# Patient Record
Sex: FEMALE | Race: WHITE | Employment: FULL TIME | URBAN - METROPOLITAN AREA
[De-identification: names, ages, dates, MRNs, and addresses within clinical notes are randomized per-mention and may not be internally consistent; named-entity substitution may affect disease eponyms.]

---

## 2019-08-01 PROBLEM — M47.812 SPONDYLOSIS OF CERVICAL REGION WITHOUT MYELOPATHY OR RADICULOPATHY: Status: ACTIVE | Noted: 2019-01-21

## 2021-07-07 ENCOUNTER — HOSPITAL ENCOUNTER (OUTPATIENT)
Dept: MAMMOGRAPHY | Age: 36
Discharge: HOME | End: 2021-07-07
Attending: OBSTETRICS & GYNECOLOGY
Payer: COMMERCIAL

## 2021-07-07 DIAGNOSIS — N63.15 BREAST LUMP ON RIGHT SIDE AT 12 O'CLOCK POSITION: ICD-10-CM

## 2021-07-07 PROCEDURE — 76642 ULTRASOUND BREAST LIMITED: CPT

## 2021-07-07 PROCEDURE — 77066 DX MAMMO INCL CAD BI: CPT

## 2022-03-18 PROBLEM — M47.812 SPONDYLOSIS OF CERVICAL REGION WITHOUT MYELOPATHY OR RADICULOPATHY: Status: ACTIVE | Noted: 2019-01-21

## 2022-06-30 ENCOUNTER — OFFICE VISIT (OUTPATIENT)
Dept: OBGYN CLINIC | Age: 37
End: 2022-06-30
Payer: COMMERCIAL

## 2022-06-30 VITALS
SYSTOLIC BLOOD PRESSURE: 104 MMHG | DIASTOLIC BLOOD PRESSURE: 70 MMHG | HEIGHT: 62 IN | BODY MASS INDEX: 35.07 KG/M2 | WEIGHT: 190.6 LBS

## 2022-06-30 DIAGNOSIS — Z01.419 WELL WOMAN EXAM: Primary | ICD-10-CM

## 2022-06-30 DIAGNOSIS — Z13.89 SCREENING FOR GENITOURINARY CONDITION: ICD-10-CM

## 2022-06-30 DIAGNOSIS — R87.610 ASCUS OF CERVIX WITH NEGATIVE HIGH RISK HPV: ICD-10-CM

## 2022-06-30 PROCEDURE — 99395 PREV VISIT EST AGE 18-39: CPT | Performed by: OBSTETRICS & GYNECOLOGY

## 2022-06-30 ASSESSMENT — PATIENT HEALTH QUESTIONNAIRE - PHQ9
SUM OF ALL RESPONSES TO PHQ9 QUESTIONS 1 & 2: 0
2. FEELING DOWN, DEPRESSED OR HOPELESS: 0
SUM OF ALL RESPONSES TO PHQ QUESTIONS 1-9: 0
1. LITTLE INTEREST OR PLEASURE IN DOING THINGS: 0
SUM OF ALL RESPONSES TO PHQ QUESTIONS 1-9: 0

## 2022-06-30 NOTE — PROGRESS NOTES
HPI: Ms. Radha Lane is a 40 y.o. female who is here today for a well woman exam. She complains of wanting to discuss pap from last year, ascus, HPV neg. Allergies   Allergen Reactions    Codeine Itching       Current Outpatient Medications   Medication Sig Dispense Refill    ALPRAZolam (XANAX) 0.5 MG tablet TAKE 1 TABLET BY MOUTH TWICE A DAY      escitalopram (LEXAPRO) 20 MG tablet TAKE 1 TABLET BY MOUTH EVERY DAY       No current facility-administered medications for this visit. Past Medical History:   Diagnosis Date    Abnormal Papanicolaou smear of cervix     Depression     Endometriosis     GERD (gastroesophageal reflux disease)     Ovarian cyst     UTI (urinary tract infection)      Past Surgical History:   Procedure Laterality Date    LAPAROSCOPY      SALPINGO-OOPHORECTOMY       Social History     Socioeconomic History    Marital status:      Spouse name: Not on file    Number of children: Not on file    Years of education: Not on file    Highest education level: Not on file   Occupational History    Not on file   Tobacco Use    Smoking status: Never Smoker    Smokeless tobacco: Never Used   Vaping Use    Vaping Use: Never used   Substance and Sexual Activity    Alcohol use: Yes     Comment: socially     Drug use: No    Sexual activity: Yes     Partners: Male     Birth control/protection: None   Other Topics Concern    Not on file   Social History Narrative    Not on file     Social Determinants of Health     Financial Resource Strain:     Difficulty of Paying Living Expenses: Not on file   Food Insecurity:     Worried About Running Out of Food in the Last Year: Not on file    Jazmine of Food in the Last Year: Not on file   Transportation Needs:     Lack of Transportation (Medical): Not on file    Lack of Transportation (Non-Medical):  Not on file   Physical Activity:     Days of Exercise per Week: Not on file    Minutes of Exercise per Session: Not on file   Stress:  Feeling of Stress : Not on file   Social Connections:     Frequency of Communication with Friends and Family: Not on file    Frequency of Social Gatherings with Friends and Family: Not on file    Attends Druze Services: Not on file    Active Member of Clubs or Organizations: Not on file    Attends Club or Organization Meetings: Not on file    Marital Status: Not on file   Intimate Partner Violence:     Fear of Current or Ex-Partner: Not on file    Emotionally Abused: Not on file    Physically Abused: Not on file    Sexually Abused: Not on file   Housing Stability:     Unable to Pay for Housing in the Last Year: Not on file    Number of [place] in the Last Year: Not on file    Unstable Housing in the Last Year: Not on file     Family History   Problem Relation Age of Onset    Hypertension Father     Cancer Maternal Grandmother         Brain    Elevated Lipids Maternal Grandmother         Date Performed Result   PAP 6/25/21 Ascus, hpv neg   Mammogram 7/7/21 with ultrasound of right breast birads 2, BD3   Colonoscopy never n/a   Dexa never n/a     Review of Systems   All other systems reviewed and are negative. /70 (Site: Right Upper Arm, Position: Sitting)   Ht 5' 2\" (1.575 m)   Wt 190 lb 9.6 oz (86.5 kg)   LMP 06/15/2022 (Within Days)   Breastfeeding No   BMI 34.86 kg/m²      Physical Exam  Constitutional:       Appearance: Normal appearance. HENT:      Head: Normocephalic. Cardiovascular:      Rate and Rhythm: Normal rate and regular rhythm. Heart sounds: Normal heart sounds. Pulmonary:      Effort: Pulmonary effort is normal.      Breath sounds: Normal breath sounds. Comments: Bilateral breast exam shows no skin changes, no retraction, no nipple changes or discharge, no masses, no supraclavicular masses and no axillary masses or nodules    Abdominal:      General: Abdomen is flat. Palpations: Abdomen is soft. There is no mass.    Genitourinary: General: Normal vulva. Comments: Normal bimanual  Musculoskeletal:         General: Normal range of motion. Skin:     General: Skin is warm and dry. Neurological:      General: No focal deficit present. Mental Status: She is alert. Psychiatric:         Mood and Affect: Mood normal.           Tests:  No results found for any visits on 06/30/22. Assessment/Plan  Diagnoses and all orders for this visit:    Well woman exam    Screening for genitourinary condition  -     AMB POC URINALYSIS DIP STICK MANUAL W/O MICRO         No follow-up provider specified.

## 2022-07-07 LAB
CYTOLOGIST CVX/VAG CYTO: NORMAL
CYTOLOGY CVX/VAG DOC THIN PREP: NORMAL
HPV APTIMA: NEGATIVE
Lab: NORMAL
PATH REPORT.FINAL DX SPEC: NORMAL
STAT OF ADQ CVX/VAG CYTO-IMP: NORMAL

## 2022-07-11 ENCOUNTER — PROCEDURE VISIT (OUTPATIENT)
Dept: OBGYN CLINIC | Age: 37
End: 2022-07-11
Payer: COMMERCIAL

## 2022-07-11 ENCOUNTER — TELEPHONE (OUTPATIENT)
Dept: OBGYN CLINIC | Age: 37
End: 2022-07-11

## 2022-07-11 DIAGNOSIS — R10.2 PELVIC PAIN IN FEMALE: Primary | ICD-10-CM

## 2022-07-11 PROCEDURE — 76830 TRANSVAGINAL US NON-OB: CPT | Performed by: OBSTETRICS & GYNECOLOGY

## 2022-07-11 NOTE — Clinical Note

## 2022-07-11 NOTE — TELEPHONE ENCOUNTER
Patient was last seen on 6/30/22 for WWE, according to her chart she has hx of laparoscopy and left salpingo-oophorectomy. Pt states that she is having pain in her RLQ. Pt reports hx of ovarian cysts and believes that is what her pain is related to now. Pain began 2 days ago. She has tried tylenol and ibuprofen without relief. LMP 6/15/22 approx.

## 2022-07-11 NOTE — TELEPHONE ENCOUNTER
Calls and said she is having a lot of pain, she has had ovarian cysts in the past and is pretty sure that is what is going on.

## 2022-07-12 ENCOUNTER — OFFICE VISIT (OUTPATIENT)
Dept: OBGYN CLINIC | Age: 37
End: 2022-07-12
Payer: COMMERCIAL

## 2022-07-12 VITALS
BODY MASS INDEX: 34.93 KG/M2 | DIASTOLIC BLOOD PRESSURE: 74 MMHG | WEIGHT: 189.8 LBS | SYSTOLIC BLOOD PRESSURE: 122 MMHG | HEIGHT: 62 IN

## 2022-07-12 DIAGNOSIS — R10.2 PELVIC PAIN IN FEMALE: Primary | ICD-10-CM

## 2022-07-12 PROCEDURE — 99214 OFFICE O/P EST MOD 30 MIN: CPT | Performed by: OBSTETRICS & GYNECOLOGY

## 2022-07-12 RX ORDER — NAPROXEN SODIUM 550 MG/1
550 TABLET ORAL 2 TIMES DAILY WITH MEALS
Qty: 60 TABLET | Refills: 0 | Status: SHIPPED | OUTPATIENT
Start: 2022-07-12

## 2022-07-12 ASSESSMENT — PATIENT HEALTH QUESTIONNAIRE - PHQ9
2. FEELING DOWN, DEPRESSED OR HOPELESS: 0
SUM OF ALL RESPONSES TO PHQ9 QUESTIONS 1 & 2: 0
SUM OF ALL RESPONSES TO PHQ QUESTIONS 1-9: 0
1. LITTLE INTEREST OR PLEASURE IN DOING THINGS: 0

## 2022-07-12 NOTE — PROGRESS NOTES
Pham Fox  is a 40 y.o. female, No obstetric history on file., Patient's last menstrual period was 06/15/2022 (within days). ,  who is seen to follow up on ultrasound that she had yesterday. She continues to have RLQ pain, states that the pain is worse with movement. See report in chart. Started Saturday gradual to at night severe pain now cramping and more with movement or cough or sneeze    \"I get these cysts all the time with short pain\"      HISTORY:  Sexual History:  has sex with males    Current Outpatient Medications on File Prior to Visit   Medication Sig Dispense Refill    ALPRAZolam (XANAX) 0.5 MG tablet 0.5 mg as needed. TAKE 1 TABLET BY MOUTH TWICE A DAY/ PRN      escitalopram (LEXAPRO) 20 MG tablet TAKE 1 TABLET BY MOUTH EVERY DAY       No current facility-administered medications on file prior to visit. ROS:  Review of Systems   All other systems reviewed and are negative. Pham Fox  has a past medical history of Abnormal Papanicolaou smear of cervix, Depression, Endometriosis, GERD (gastroesophageal reflux disease), Ovarian cyst, and UTI (urinary tract infection). .    Previous surgeries include  has a past surgical history that includes laparoscopy and Salpingo-oophorectomy. .    Her current meds are   Current Outpatient Medications:     naproxen sodium (ANAPROX DS) 550 MG tablet, Take 1 tablet by mouth 2 times daily (with meals), Disp: 60 tablet, Rfl: 0    ALPRAZolam (XANAX) 0.5 MG tablet, 0.5 mg as needed. TAKE 1 TABLET BY MOUTH TWICE A DAY/ PRN, Disp: , Rfl:     escitalopram (LEXAPRO) 20 MG tablet, TAKE 1 TABLET BY MOUTH EVERY DAY, Disp: , Rfl:      Family history is significant for family history includes Cancer in her maternal grandmother; Elevated Lipids in her maternal grandmother; Hypertension in her father. .       PHYSICAL EXAM:  Blood pressure 122/74, height 5' 2\" (1.575 m), weight 189 lb 12.8 oz (86.1 kg), last menstrual period 06/15/2022, not currently breastfeeding.  OBGyn Exam

## 2022-07-19 RX ORDER — ELAGOLIX 200 MG/1
1 TABLET, FILM COATED ORAL 2 TIMES DAILY
Qty: 60 TABLET | Refills: 2 | Status: SHIPPED | OUTPATIENT
Start: 2022-07-19

## 2022-07-19 NOTE — TELEPHONE ENCOUNTER
Received fax from [facility] stating that her insurance plan has met requirements for rx of Tina Hernandez and now needs rx sent to pharmacy. Office note from 7/12/22 states rx should be for orilissa 200 mg BID for 3 months then return for visit.

## 2024-02-14 ENCOUNTER — OFFICE VISIT (OUTPATIENT)
Dept: OBGYN CLINIC | Age: 39
End: 2024-02-14
Payer: COMMERCIAL

## 2024-02-14 VITALS
BODY MASS INDEX: 34.34 KG/M2 | HEIGHT: 62 IN | SYSTOLIC BLOOD PRESSURE: 112 MMHG | WEIGHT: 186.6 LBS | DIASTOLIC BLOOD PRESSURE: 80 MMHG

## 2024-02-14 DIAGNOSIS — R10.2 PELVIC PAIN IN FEMALE: Primary | ICD-10-CM

## 2024-02-14 PROCEDURE — 99214 OFFICE O/P EST MOD 30 MIN: CPT | Performed by: OBSTETRICS & GYNECOLOGY

## 2024-02-14 RX ORDER — METHENAMINE, SODIUM PHOSPHATE, MONOBASIC, MONOHYDRATE, PHENYL SALICYLATE, METHYLENE BLUE, AND HYOSCYAMINE SULFATE 118; 40.8; 36; 10; .12 MG/1; MG/1; MG/1; MG/1; MG/1
1 CAPSULE ORAL 4 TIMES DAILY
Qty: 40 CAPSULE | Refills: 0 | Status: SHIPPED | OUTPATIENT
Start: 2024-02-14

## 2024-02-14 NOTE — PROGRESS NOTES
Oneyda  is a 38 y.o. female, No obstetric history on file., Patient's last menstrual period was 02/03/2024 (exact date).,  who is seen for periods that are becoming heavier, lower back pain, and pelvic pain in center of pelvis. She is having pain during and the day after intercourse. Periods were previously 4 days but now are lasting 7 days. She states that her back pain has been occurring x3-4 months, heavier periods within the past 8 months, pelvic pain began 8-12 months ago. She also states that there is a feeling of heaviness in her pelvis, similar to something pressing on her bladder but does not have much urine when she uses the restroom.     3 of 7 days have to change products 1-2 hours in day  Mid lower abdomen with periods and day after intercourse more pain and fullness    Orilissa caused hot flushes and felt \"ill\"    HISTORY:  Sexual History:  single partner, contraception - none  Contraception:  none  Current Outpatient Medications on File Prior to Visit   Medication Sig Dispense Refill    tretinoin (RETIN-A) 0.025 % cream Apply topically nightly      ALPRAZolam (XANAX) 0.5 MG tablet 1 tablet as needed. TAKE 1 TABLET BY MOUTH TWICE A DAY/ PRN      escitalopram (LEXAPRO) 20 MG tablet TAKE 1 TABLET BY MOUTH EVERY DAY       No current facility-administered medications on file prior to visit.       ROS:  Review of Systems     Oneyda  has a past medical history of Abnormal Papanicolaou smear of cervix, Complication of anesthesia, Depression, Endometriosis, GERD (gastroesophageal reflux disease), Migraine, Ovarian cyst, and UTI (urinary tract infection). .    Previous surgeries include  has a past surgical history that includes laparoscopy and Salpingo-oophorectomy (Left)..    Her current meds are   Current Outpatient Medications:     tretinoin (RETIN-A) 0.025 % cream, Apply topically nightly, Disp: , Rfl:     Meth-Hyo-M Bl-Na Phos-Ph Sal (URIBEL) 118 MG CAPS, Take 1 capsule by mouth 4 times daily, Disp: 40

## 2024-03-18 ENCOUNTER — OFFICE VISIT (OUTPATIENT)
Dept: OBGYN CLINIC | Age: 39
End: 2024-03-18
Payer: COMMERCIAL

## 2024-03-18 ENCOUNTER — PROCEDURE VISIT (OUTPATIENT)
Dept: OBGYN CLINIC | Age: 39
End: 2024-03-18
Payer: COMMERCIAL

## 2024-03-18 VITALS — DIASTOLIC BLOOD PRESSURE: 72 MMHG | SYSTOLIC BLOOD PRESSURE: 110 MMHG

## 2024-03-18 DIAGNOSIS — R10.2 PELVIC PAIN IN FEMALE: Primary | ICD-10-CM

## 2024-03-18 DIAGNOSIS — Z87.42 PERSONAL HISTORY OF OVARIAN CYST: ICD-10-CM

## 2024-03-18 DIAGNOSIS — N83.201 CYST OF RIGHT OVARY: ICD-10-CM

## 2024-03-18 DIAGNOSIS — Z87.42 PERSONAL HISTORY OF ENDOMETRIOSIS: ICD-10-CM

## 2024-03-18 PROCEDURE — 76830 TRANSVAGINAL US NON-OB: CPT | Performed by: OBSTETRICS & GYNECOLOGY

## 2024-03-18 PROCEDURE — 99214 OFFICE O/P EST MOD 30 MIN: CPT | Performed by: OBSTETRICS & GYNECOLOGY

## 2024-03-18 ASSESSMENT — PATIENT HEALTH QUESTIONNAIRE - PHQ9
SUM OF ALL RESPONSES TO PHQ QUESTIONS 1-9: 0
SUM OF ALL RESPONSES TO PHQ QUESTIONS 1-9: 0
1. LITTLE INTEREST OR PLEASURE IN DOING THINGS: NOT AT ALL
SUM OF ALL RESPONSES TO PHQ QUESTIONS 1-9: 0
SUM OF ALL RESPONSES TO PHQ QUESTIONS 1-9: 0
2. FEELING DOWN, DEPRESSED OR HOPELESS: NOT AT ALL
SUM OF ALL RESPONSES TO PHQ9 QUESTIONS 1 & 2: 0

## 2024-03-18 NOTE — PROGRESS NOTES
robotic assistance plus or minus removing her right ovary.  No orders of the defined types were placed in this encounter.       Portions of this note were created using voice recognition software. Despite my efforts to edit grammatical and transcription errors, bizarre and nonsensical sentences may still exist.     No

## 2024-03-22 PROBLEM — N94.6 DYSMENORRHEA: Status: ACTIVE | Noted: 2024-04-11

## 2024-03-22 PROBLEM — N83.209 OVARIAN CYST: Status: ACTIVE | Noted: 2024-04-11

## 2024-03-22 PROBLEM — N93.9 ABNORMAL UTERINE BLEEDING: Status: ACTIVE | Noted: 2024-04-11

## 2024-03-26 NOTE — PROGRESS NOTES
Enhanced Recovery After GYN Surgery: non-diabetic patients    It is highly recommended you purchase and drink Ensure Complete - one bottle twice daily for five days starting on 04/05/24. Ensure Complete is the preferred formula over other Ensure formulas. It is recommended that you continue drinking this for one month after surgery.    The night before surgery 04/10/24, drink 2 bottles of the Ensure Pre-Surgery drink.     The morning of surgery 04/11/24, drink one bottle of the Ensure Pre-Surgery drink 2 hours prior to your arrival to the hospital. Drink this over 5-10 minutes.    Drink nothing else after drinking the pre-surgical drink the morning of surgery.    Bring your patient handbook with you to the hospital.    Things to remember:    1. You will be given clear liquids to drink, advancing diet as tolerated    2. You will be up and moving around with assistance 2-4 hours after surgery.    3. You will be given regularly scheduled pain medications (NSAIDS, Tylenol) with narcotics as needed.    4. You may be able to go home that night if the surgeon okays and you are up and eating and drinking. Otherwise, your discharge will be the following morning around lunch time.     5. Continue drinking Ensure Complete for 5 days after surgery.

## 2024-03-29 RX ORDER — UBROGEPANT 100 MG/1
100 TABLET ORAL DAILY PRN
COMMUNITY

## 2024-03-29 NOTE — PERIOP NOTE
PLEASE CONTINUE TAKING ALL PRESCRIPTION MEDICATIONS UP TO THE DAY OF SURGERY UNLESS OTHERWISE DIRECTED BELOW. You may take Tylenol, allergy, and/or indigestion medications.     TAKE ONLY THESE MEDICATIONS ON THE DAY OF SURGERY OB 04/11/24      Xanax if needed, Ubrelvy if needed            DISCONTINUE all vitamins, herbals, and supplements 7 days prior to surgery. DISCONTINUE Non-Steroidal Anti-Inflammatory (NSAIDS) such as Advil, Ibuprofen, Motrin, Aspirin, Naproxen, and Aleve 5 days prior to surgery.     Home Medications to Hold- please continue all other medications except these.      NONE        Comments      On the day before surgery (04/10/24) please take 2 Tylenol in the morning and then again before bed. You may use either regular or extra strength.      Bring: Photo ID and Insurance card        Please do not bring home medications with you on the day of surgery unless otherwise directed by your nurse.  If you are instructed to bring home medications, please give them to your nurse as they will be administered by the nursing staff.    If you have any questions, please call [facility] [phone].    A copy of this note was provided to the patient for reference.

## 2024-03-29 NOTE — PERIOP NOTE
Patient verified name and     Order for consent NOT found in EHR; patient verified.     Type 2 surgery phone assessment complete.     Labs per surgeon: orders NOT received at time of assessment.   Labs per anesthesia protocol: HGB to be collected at gyn class on 4/3/24. T&S to be collected dos.   EKG: not needed per anesthesia protocol.       Patient informed of GYN class on 4/3/24 (9:30 am) at which time labs will be drawn. Patient will also receive all patient education and if staying overnight will receive hospital approved surgical skin cleanser; if not, patient will use non-moisturizing soap.    Patient instructed to hold all vitamins 7 days prior to surgery and NSAIDS 5 days prior to surgery, patient verbalized understanding.    Patient instructed to continue previous medications as prescribed prior to surgery and to take the following medications the day of surgery according to anesthesia guidelines with a small sip of water: Xanax if needed, Ubrelvy if needed.     Patient answered medical/surgical history questions at their best of ability. All prior to admission medications documented in [facility].

## 2024-04-03 ENCOUNTER — HOSPITAL ENCOUNTER (OUTPATIENT)
Dept: SURGERY | Age: 39
Discharge: HOME | End: 2024-04-06
Payer: COMMERCIAL

## 2024-04-03 DIAGNOSIS — Z01.818 PRE-OP TESTING: ICD-10-CM

## 2024-04-03 LAB — HGB BLD-MCNC: 14 G/DL (ref 11.7–15.4)

## 2024-04-03 PROCEDURE — 85018 HEMOGLOBIN: CPT

## 2024-04-03 PROCEDURE — 36415 COLL VENOUS BLD VENIPUNCTURE: CPT

## 2024-04-04 ENCOUNTER — OFFICE VISIT (OUTPATIENT)
Dept: OBGYN CLINIC | Age: 39
End: 2024-04-04

## 2024-04-04 VITALS
WEIGHT: 187.8 LBS | DIASTOLIC BLOOD PRESSURE: 70 MMHG | SYSTOLIC BLOOD PRESSURE: 110 MMHG | HEIGHT: 62 IN | BODY MASS INDEX: 34.56 KG/M2

## 2024-04-04 DIAGNOSIS — R10.2 PELVIC PAIN IN FEMALE: Primary | ICD-10-CM

## 2024-04-04 DIAGNOSIS — Z87.42 PERSONAL HISTORY OF OVARIAN CYST: ICD-10-CM

## 2024-04-04 DIAGNOSIS — Z87.42 PERSONAL HISTORY OF ENDOMETRIOSIS: ICD-10-CM

## 2024-04-04 RX ORDER — SODIUM CHLORIDE 9 MG/ML
INJECTION, SOLUTION INTRAVENOUS PRN
Status: CANCELLED | OUTPATIENT
Start: 2024-04-04

## 2024-04-04 RX ORDER — SODIUM CHLORIDE 0.9 % (FLUSH) 0.9 %
5-40 SYRINGE (ML) INJECTION EVERY 12 HOURS SCHEDULED
Status: CANCELLED | OUTPATIENT
Start: 2024-04-04

## 2024-04-04 RX ORDER — SODIUM CHLORIDE 0.9 % (FLUSH) 0.9 %
5-40 SYRINGE (ML) INJECTION PRN
Status: CANCELLED | OUTPATIENT
Start: 2024-04-04

## 2024-04-04 ASSESSMENT — PATIENT HEALTH QUESTIONNAIRE - PHQ9
2. FEELING DOWN, DEPRESSED OR HOPELESS: NOT AT ALL
1. LITTLE INTEREST OR PLEASURE IN DOING THINGS: NOT AT ALL
SUM OF ALL RESPONSES TO PHQ QUESTIONS 1-9: 0
SUM OF ALL RESPONSES TO PHQ9 QUESTIONS 1 & 2: 0
SUM OF ALL RESPONSES TO PHQ QUESTIONS 1-9: 0

## 2024-04-04 NOTE — H&P
Gynecology History and Physical    Name: Oneyda Bello MRN: [id number] SSN: [id number]    YOB: 1985  Age: 39 y.o.  Sex: female       Subjective:      Chief complaint:  Dysmenorrhea, Endometriosis, Menorrhagia, Ovarian cyst - recurrent, and Pelvic pain    Oneyda is a 39 y.o.  female with a history of chronic pelvic pain, dysmenorrhea, endometriosis, menorrhagia, ovarian cyst - recurrent, and pelvic pain.     GYN US PERFORMED SECONDARY TO PELVIC PAIN, HEAVY PERIODS, H/O ENDOMETRIOSIS, H/O OVARIAN CYSTS   CX APPEARS WNL   UTERUS IS ANTEVERTED AND INHOMOGENEOUS WITH FIBROIDS   F1- POSTERIOR- 2.7 X 2.5 X 2.9 CM (PREV- 2.4 X 2.6 X 2.5 CM)   F2- ANTERIOR- 0.9 X 0.8 X 1.1 CM (PREV- 0.8 X 0.7 X 0.9 CM)   ENDO= 7.5MM , NO INTRACAVITARY MASSES VISUALIZED   ROV VISUALIZED WITH MULTIPLE CYSTS:   C1- COMPLEX- 1.9 X 1.9 X 1.8 CM (PROBABLE HEMORRHAGIC)   C2- COLLAPSING CYST- 1.7 X 1.2 X 1.1 CM   F3- COLLAPSING CYST- 1 X 1.5 X 1.1 CM   C4- SIMPLE- 1.5 X 1.2 X 1.4 CM   LOV SURGICALLY REMOVED   BILATERAL ADN APPEAR WNL     She has had a previous left oophorectomy for ovarian cysts and pain.  She has prior history of laparoscopic diagnosed endometriosis.  She has tried Orilissa multiple oral contraceptives progestins without success.    OB History          0    Para   0    Term   0       0    AB   0    Living   0         SAB   0    IAB   0    Ectopic   0    Molar   0    Multiple   0    Live Births   0              Past Medical History:   Diagnosis Date    Abnormal Papanicolaou smear of cervix     Anxiety     BMI 33.0-33.9,adult     Childhood asthma     Complication of anesthesia     Blindness when waking from Anesthesia in  &     Depression     Endometriosis     GERD (gastroesophageal reflux disease)     Migraine     prn med    Ovarian cyst     UTI (urinary tract infection)      Past Surgical History:   Procedure Laterality Date    LAPAROSCOPY  2002    SALPINGO-OOPHORECTOMY Left 2003    SKIN

## 2024-04-04 NOTE — PROGRESS NOTES
Preop Visit    Ms. Oneyda Bello presents for a preop visit.  She is scheduled for a Total Laparoscopic Hysterectomy, possible right Salpingo-oophorectomy, and Robotic. .  Her history, meds, and allergies were reviewed.  The procedure was reviewed in detail as well as the risks of bleeding, infection, DVT and potential surgical complications involving the bladder, ureters, colon or intestines.  Also the alternatives,  benefits, recovery and follow-up. Prevention of SSI discussed as indicated.  All of her questions were answered.    Wishes ovary to be removed discussed with her that this would require her then to be on hormone replacement therapy and she understands this.  We have previously discussed this with her and she feels that this has been continued problems with her ovaries in the past and wishes her ovaries be removed.    Exam:  Lungs CTAB  Heart RRR    See the hospital H&P as well as prior chart for details.

## 2024-04-04 NOTE — H&P (VIEW-ONLY)
Gynecology History and Physical    Name: Oneyda Bello MRN: [id number] SSN: [id number]    YOB: 1985  Age: 39 y.o.  Sex: female       Subjective:      Chief complaint:  Dysmenorrhea, Endometriosis, Menorrhagia, Ovarian cyst - recurrent, and Pelvic pain    Oneyda is a 39 y.o.  female with a history of chronic pelvic pain, dysmenorrhea, endometriosis, menorrhagia, ovarian cyst - recurrent, and pelvic pain.     GYN US PERFORMED SECONDARY TO PELVIC PAIN, HEAVY PERIODS, H/O ENDOMETRIOSIS, H/O OVARIAN CYSTS   CX APPEARS WNL   UTERUS IS ANTEVERTED AND INHOMOGENEOUS WITH FIBROIDS   F1- POSTERIOR- 2.7 X 2.5 X 2.9 CM (PREV- 2.4 X 2.6 X 2.5 CM)   F2- ANTERIOR- 0.9 X 0.8 X 1.1 CM (PREV- 0.8 X 0.7 X 0.9 CM)   ENDO= 7.5MM , NO INTRACAVITARY MASSES VISUALIZED   ROV VISUALIZED WITH MULTIPLE CYSTS:   C1- COMPLEX- 1.9 X 1.9 X 1.8 CM (PROBABLE HEMORRHAGIC)   C2- COLLAPSING CYST- 1.7 X 1.2 X 1.1 CM   F3- COLLAPSING CYST- 1 X 1.5 X 1.1 CM   C4- SIMPLE- 1.5 X 1.2 X 1.4 CM   LOV SURGICALLY REMOVED   BILATERAL ADN APPEAR WNL     She has had a previous left oophorectomy for ovarian cysts and pain.  She has prior history of laparoscopic diagnosed endometriosis.  She has tried Orilissa multiple oral contraceptives progestins without success.    OB History          0    Para   0    Term   0       0    AB   0    Living   0         SAB   0    IAB   0    Ectopic   0    Molar   0    Multiple   0    Live Births   0              Past Medical History:   Diagnosis Date    Abnormal Papanicolaou smear of cervix     Anxiety     BMI 33.0-33.9,adult     Childhood asthma     Complication of anesthesia     Blindness when waking from Anesthesia in  &     Depression     Endometriosis     GERD (gastroesophageal reflux disease)     Migraine     prn med    Ovarian cyst     UTI (urinary tract infection)      Past Surgical History:   Procedure Laterality Date    LAPAROSCOPY  2002    SALPINGO-OOPHORECTOMY Left 2003    SKIN

## 2024-04-10 ENCOUNTER — ANESTHESIA EVENT (OUTPATIENT)
Dept: SURGERY | Age: 39
End: 2024-04-10
Payer: COMMERCIAL

## 2024-04-11 ENCOUNTER — ANESTHESIA (OUTPATIENT)
Dept: SURGERY | Age: 39
End: 2024-04-11
Payer: COMMERCIAL

## 2024-04-11 ENCOUNTER — HOSPITAL ENCOUNTER (OUTPATIENT)
Age: 39
Discharge: HOME | End: 2024-04-12
Attending: OBSTETRICS & GYNECOLOGY | Admitting: OBSTETRICS & GYNECOLOGY
Payer: COMMERCIAL

## 2024-04-11 ENCOUNTER — PREP FOR PROCEDURE (OUTPATIENT)
Dept: OBGYN CLINIC | Age: 39
End: 2024-04-11

## 2024-04-11 DIAGNOSIS — N94.6 DYSMENORRHEA: ICD-10-CM

## 2024-04-11 DIAGNOSIS — Z01.818 PRE-OP TESTING: Primary | ICD-10-CM

## 2024-04-11 DIAGNOSIS — N93.9 ABNORMAL UTERINE BLEEDING: ICD-10-CM

## 2024-04-11 DIAGNOSIS — Z90.710 STATUS POST HYSTERECTOMY: ICD-10-CM

## 2024-04-11 LAB
ABO + RH BLD: NORMAL
BLOOD GROUP ANTIBODIES SERPL: NORMAL
HCG UR QL: NEGATIVE
SPECIMEN EXP DATE BLD: NORMAL

## 2024-04-11 PROCEDURE — 6370000000 HC RX 637 (ALT 250 FOR IP): Performed by: OBSTETRICS & GYNECOLOGY

## 2024-04-11 PROCEDURE — 6360000002 HC RX W HCPCS: Performed by: OBSTETRICS & GYNECOLOGY

## 2024-04-11 PROCEDURE — 2700000000 HC OXYGEN THERAPY PER DAY

## 2024-04-11 PROCEDURE — 6360000002 HC RX W HCPCS: Performed by: ANESTHESIOLOGY

## 2024-04-11 PROCEDURE — S2900 ROBOTIC SURGICAL SYSTEM: HCPCS | Performed by: OBSTETRICS & GYNECOLOGY

## 2024-04-11 PROCEDURE — 2580000003 HC RX 258: Performed by: OBSTETRICS & GYNECOLOGY

## 2024-04-11 PROCEDURE — 86901 BLOOD TYPING SEROLOGIC RH(D): CPT

## 2024-04-11 PROCEDURE — 94760 N-INVAS EAR/PLS OXIMETRY 1: CPT

## 2024-04-11 PROCEDURE — 2500000003 HC RX 250 WO HCPCS: Performed by: ANESTHESIOLOGY

## 2024-04-11 PROCEDURE — 86900 BLOOD TYPING SEROLOGIC ABO: CPT

## 2024-04-11 PROCEDURE — 2500000003 HC RX 250 WO HCPCS: Performed by: NURSE ANESTHETIST, CERTIFIED REGISTERED

## 2024-04-11 PROCEDURE — 6360000002 HC RX W HCPCS: Performed by: NURSE ANESTHETIST, CERTIFIED REGISTERED

## 2024-04-11 PROCEDURE — 2580000003 HC RX 258: Performed by: NURSE ANESTHETIST, CERTIFIED REGISTERED

## 2024-04-11 PROCEDURE — 2580000003 HC RX 258: Performed by: ANESTHESIOLOGY

## 2024-04-11 PROCEDURE — 7100000001 HC PACU RECOVERY - ADDTL 15 MIN: Performed by: OBSTETRICS & GYNECOLOGY

## 2024-04-11 PROCEDURE — 81025 URINE PREGNANCY TEST: CPT

## 2024-04-11 PROCEDURE — 7100000000 HC PACU RECOVERY - FIRST 15 MIN: Performed by: OBSTETRICS & GYNECOLOGY

## 2024-04-11 PROCEDURE — 88307 TISSUE EXAM BY PATHOLOGIST: CPT

## 2024-04-11 PROCEDURE — 86850 RBC ANTIBODY SCREEN: CPT

## 2024-04-11 PROCEDURE — 2720000010 HC SURG SUPPLY STERILE: Performed by: OBSTETRICS & GYNECOLOGY

## 2024-04-11 PROCEDURE — 2709999900 HC NON-CHARGEABLE SUPPLY: Performed by: OBSTETRICS & GYNECOLOGY

## 2024-04-11 PROCEDURE — 6360000002 HC RX W HCPCS

## 2024-04-11 PROCEDURE — 3600000019 HC SURGERY ROBOT ADDTL 15MIN: Performed by: OBSTETRICS & GYNECOLOGY

## 2024-04-11 PROCEDURE — 94761 N-INVAS EAR/PLS OXIMETRY MLT: CPT

## 2024-04-11 PROCEDURE — 3700000001 HC ADD 15 MINUTES (ANESTHESIA): Performed by: OBSTETRICS & GYNECOLOGY

## 2024-04-11 PROCEDURE — 3600000009 HC SURGERY ROBOT BASE: Performed by: OBSTETRICS & GYNECOLOGY

## 2024-04-11 PROCEDURE — 58571 TLH W/T/O 250 G OR LESS: CPT | Performed by: OBSTETRICS & GYNECOLOGY

## 2024-04-11 PROCEDURE — 6370000000 HC RX 637 (ALT 250 FOR IP): Performed by: ANESTHESIOLOGY

## 2024-04-11 PROCEDURE — 3700000000 HC ANESTHESIA ATTENDED CARE: Performed by: OBSTETRICS & GYNECOLOGY

## 2024-04-11 RX ORDER — OXYCODONE HYDROCHLORIDE 5 MG/1
5 TABLET ORAL
Status: DISCONTINUED | OUTPATIENT
Start: 2024-04-11 | End: 2024-04-11 | Stop reason: HOSPADM

## 2024-04-11 RX ORDER — BUPIVACAINE HYDROCHLORIDE 5 MG/ML
INJECTION, SOLUTION EPIDURAL; INTRACAUDAL PRN
Status: DISCONTINUED | OUTPATIENT
Start: 2024-04-11 | End: 2024-04-11 | Stop reason: ALTCHOICE

## 2024-04-11 RX ORDER — GLYCOPYRROLATE 0.2 MG/ML
INJECTION INTRAMUSCULAR; INTRAVENOUS PRN
Status: DISCONTINUED | OUTPATIENT
Start: 2024-04-11 | End: 2024-04-11 | Stop reason: SDUPTHER

## 2024-04-11 RX ORDER — SODIUM CHLORIDE 0.9 % (FLUSH) 0.9 %
5-40 SYRINGE (ML) INJECTION EVERY 12 HOURS SCHEDULED
Status: DISCONTINUED | OUTPATIENT
Start: 2024-04-11 | End: 2024-04-11 | Stop reason: HOSPADM

## 2024-04-11 RX ORDER — HALOPERIDOL 5 MG/ML
1 INJECTION INTRAMUSCULAR
Status: DISCONTINUED | OUTPATIENT
Start: 2024-04-11 | End: 2024-04-11 | Stop reason: HOSPADM

## 2024-04-11 RX ORDER — MIDAZOLAM HYDROCHLORIDE 2 MG/2ML
2 INJECTION, SOLUTION INTRAMUSCULAR; INTRAVENOUS
Status: COMPLETED | OUTPATIENT
Start: 2024-04-11 | End: 2024-04-11

## 2024-04-11 RX ORDER — HYDROMORPHONE HYDROCHLORIDE 1 MG/ML
0.5 INJECTION, SOLUTION INTRAMUSCULAR; INTRAVENOUS; SUBCUTANEOUS EVERY 5 MIN PRN
Status: COMPLETED | OUTPATIENT
Start: 2024-04-11 | End: 2024-04-11

## 2024-04-11 RX ORDER — OXYCODONE HYDROCHLORIDE 5 MG/1
5 TABLET ORAL ONCE
Status: COMPLETED | OUTPATIENT
Start: 2024-04-11 | End: 2024-04-11

## 2024-04-11 RX ORDER — SODIUM CHLORIDE 9 MG/ML
INJECTION, SOLUTION INTRAVENOUS PRN
Status: DISCONTINUED | OUTPATIENT
Start: 2024-04-11 | End: 2024-04-11 | Stop reason: HOSPADM

## 2024-04-11 RX ORDER — ONDANSETRON 2 MG/ML
4 INJECTION INTRAMUSCULAR; INTRAVENOUS EVERY 6 HOURS PRN
Status: DISCONTINUED | OUTPATIENT
Start: 2024-04-11 | End: 2024-04-12 | Stop reason: HOSPADM

## 2024-04-11 RX ORDER — LIDOCAINE HYDROCHLORIDE 20 MG/ML
INJECTION, SOLUTION EPIDURAL; INFILTRATION; INTRACAUDAL; PERINEURAL PRN
Status: DISCONTINUED | OUTPATIENT
Start: 2024-04-11 | End: 2024-04-11 | Stop reason: SDUPTHER

## 2024-04-11 RX ORDER — ESCITALOPRAM OXALATE 10 MG/1
20 TABLET ORAL NIGHTLY
Status: DISCONTINUED | OUTPATIENT
Start: 2024-04-11 | End: 2024-04-12 | Stop reason: HOSPADM

## 2024-04-11 RX ORDER — NEOSTIGMINE METHYLSULFATE 1 MG/ML
INJECTION, SOLUTION INTRAVENOUS PRN
Status: DISCONTINUED | OUTPATIENT
Start: 2024-04-11 | End: 2024-04-11 | Stop reason: SDUPTHER

## 2024-04-11 RX ORDER — ACETAMINOPHEN 500 MG
1000 TABLET ORAL ONCE
Status: COMPLETED | OUTPATIENT
Start: 2024-04-11 | End: 2024-04-11

## 2024-04-11 RX ORDER — SODIUM CHLORIDE 0.9 % (FLUSH) 0.9 %
5-40 SYRINGE (ML) INJECTION PRN
Status: DISCONTINUED | OUTPATIENT
Start: 2024-04-11 | End: 2024-04-11 | Stop reason: HOSPADM

## 2024-04-11 RX ORDER — IBUPROFEN 800 MG/1
800 TABLET ORAL EVERY 8 HOURS
Status: DISCONTINUED | OUTPATIENT
Start: 2024-04-12 | End: 2024-04-12 | Stop reason: HOSPADM

## 2024-04-11 RX ORDER — SODIUM CHLORIDE 0.9 % (FLUSH) 0.9 %
5-40 SYRINGE (ML) INJECTION PRN
Status: DISCONTINUED | OUTPATIENT
Start: 2024-04-11 | End: 2024-04-12 | Stop reason: HOSPADM

## 2024-04-11 RX ORDER — FENTANYL CITRATE 50 UG/ML
100 INJECTION, SOLUTION INTRAMUSCULAR; INTRAVENOUS
Status: DISCONTINUED | OUTPATIENT
Start: 2024-04-11 | End: 2024-04-11 | Stop reason: HOSPADM

## 2024-04-11 RX ORDER — ALPRAZOLAM 0.5 MG/1
0.5 TABLET ORAL 2 TIMES DAILY PRN
Status: DISCONTINUED | OUTPATIENT
Start: 2024-04-11 | End: 2024-04-12 | Stop reason: HOSPADM

## 2024-04-11 RX ORDER — ONDANSETRON 2 MG/ML
INJECTION INTRAMUSCULAR; INTRAVENOUS PRN
Status: DISCONTINUED | OUTPATIENT
Start: 2024-04-11 | End: 2024-04-11 | Stop reason: SDUPTHER

## 2024-04-11 RX ORDER — NALOXONE HYDROCHLORIDE 0.4 MG/ML
INJECTION, SOLUTION INTRAMUSCULAR; INTRAVENOUS; SUBCUTANEOUS PRN
Status: DISCONTINUED | OUTPATIENT
Start: 2024-04-11 | End: 2024-04-11 | Stop reason: HOSPADM

## 2024-04-11 RX ORDER — LIDOCAINE HYDROCHLORIDE 10 MG/ML
1 INJECTION, SOLUTION INFILTRATION; PERINEURAL
Status: DISCONTINUED | OUTPATIENT
Start: 2024-04-11 | End: 2024-04-11 | Stop reason: HOSPADM

## 2024-04-11 RX ORDER — SODIUM CHLORIDE 0.9 % (FLUSH) 0.9 %
5-40 SYRINGE (ML) INJECTION EVERY 12 HOURS SCHEDULED
Status: DISCONTINUED | OUTPATIENT
Start: 2024-04-11 | End: 2024-04-12 | Stop reason: HOSPADM

## 2024-04-11 RX ORDER — SODIUM CHLORIDE, SODIUM LACTATE, POTASSIUM CHLORIDE, CALCIUM CHLORIDE 600; 310; 30; 20 MG/100ML; MG/100ML; MG/100ML; MG/100ML
INJECTION, SOLUTION INTRAVENOUS CONTINUOUS
Status: DISCONTINUED | OUTPATIENT
Start: 2024-04-11 | End: 2024-04-11 | Stop reason: HOSPADM

## 2024-04-11 RX ORDER — PROPOFOL 10 MG/ML
INJECTION, EMULSION INTRAVENOUS PRN
Status: DISCONTINUED | OUTPATIENT
Start: 2024-04-11 | End: 2024-04-11 | Stop reason: SDUPTHER

## 2024-04-11 RX ORDER — ACETAMINOPHEN 500 MG
1000 TABLET ORAL EVERY 8 HOURS SCHEDULED
Status: DISCONTINUED | OUTPATIENT
Start: 2024-04-11 | End: 2024-04-12 | Stop reason: HOSPADM

## 2024-04-11 RX ORDER — SIMETHICONE 80 MG
80 TABLET,CHEWABLE ORAL EVERY 6 HOURS PRN
Status: DISCONTINUED | OUTPATIENT
Start: 2024-04-11 | End: 2024-04-12 | Stop reason: HOSPADM

## 2024-04-11 RX ORDER — SCOLOPAMINE TRANSDERMAL SYSTEM 1 MG/1
1 PATCH, EXTENDED RELEASE TRANSDERMAL ONCE
Status: DISCONTINUED | OUTPATIENT
Start: 2024-04-11 | End: 2024-04-11

## 2024-04-11 RX ORDER — OXYCODONE HYDROCHLORIDE 5 MG/1
5 TABLET ORAL EVERY 4 HOURS PRN
Status: DISCONTINUED | OUTPATIENT
Start: 2024-04-11 | End: 2024-04-12 | Stop reason: HOSPADM

## 2024-04-11 RX ORDER — ONDANSETRON 2 MG/ML
4 INJECTION INTRAMUSCULAR; INTRAVENOUS
Status: DISCONTINUED | OUTPATIENT
Start: 2024-04-11 | End: 2024-04-11 | Stop reason: HOSPADM

## 2024-04-11 RX ORDER — OXYCODONE HYDROCHLORIDE 5 MG/1
10 TABLET ORAL EVERY 4 HOURS PRN
Status: DISCONTINUED | OUTPATIENT
Start: 2024-04-11 | End: 2024-04-12 | Stop reason: HOSPADM

## 2024-04-11 RX ORDER — ONDANSETRON 4 MG/1
4 TABLET, ORALLY DISINTEGRATING ORAL EVERY 8 HOURS PRN
Status: DISCONTINUED | OUTPATIENT
Start: 2024-04-11 | End: 2024-04-12 | Stop reason: HOSPADM

## 2024-04-11 RX ORDER — SODIUM CHLORIDE, SODIUM LACTATE, POTASSIUM CHLORIDE, CALCIUM CHLORIDE 600; 310; 30; 20 MG/100ML; MG/100ML; MG/100ML; MG/100ML
INJECTION, SOLUTION INTRAVENOUS CONTINUOUS PRN
Status: DISCONTINUED | OUTPATIENT
Start: 2024-04-11 | End: 2024-04-11 | Stop reason: SDUPTHER

## 2024-04-11 RX ORDER — PROCHLORPERAZINE EDISYLATE 5 MG/ML
5 INJECTION INTRAMUSCULAR; INTRAVENOUS
Status: DISCONTINUED | OUTPATIENT
Start: 2024-04-11 | End: 2024-04-11 | Stop reason: HOSPADM

## 2024-04-11 RX ORDER — KETAMINE HYDROCHLORIDE 50 MG/ML
INJECTION, SOLUTION INTRAMUSCULAR; INTRAVENOUS PRN
Status: DISCONTINUED | OUTPATIENT
Start: 2024-04-11 | End: 2024-04-11 | Stop reason: SDUPTHER

## 2024-04-11 RX ORDER — HYDROMORPHONE HYDROCHLORIDE 2 MG/ML
0.5 INJECTION, SOLUTION INTRAMUSCULAR; INTRAVENOUS; SUBCUTANEOUS EVERY 5 MIN PRN
Status: COMPLETED | OUTPATIENT
Start: 2024-04-11 | End: 2024-04-11

## 2024-04-11 RX ORDER — ROCURONIUM BROMIDE 10 MG/ML
INJECTION, SOLUTION INTRAVENOUS PRN
Status: DISCONTINUED | OUTPATIENT
Start: 2024-04-11 | End: 2024-04-11 | Stop reason: SDUPTHER

## 2024-04-11 RX ORDER — KETOROLAC TROMETHAMINE 30 MG/ML
30 INJECTION, SOLUTION INTRAMUSCULAR; INTRAVENOUS EVERY 6 HOURS
Status: COMPLETED | OUTPATIENT
Start: 2024-04-11 | End: 2024-04-12

## 2024-04-11 RX ORDER — SODIUM CHLORIDE, SODIUM LACTATE, POTASSIUM CHLORIDE, CALCIUM CHLORIDE 600; 310; 30; 20 MG/100ML; MG/100ML; MG/100ML; MG/100ML
INJECTION, SOLUTION INTRAVENOUS CONTINUOUS
Status: DISCONTINUED | OUTPATIENT
Start: 2024-04-11 | End: 2024-04-12 | Stop reason: HOSPADM

## 2024-04-11 RX ORDER — FENTANYL CITRATE 50 UG/ML
INJECTION, SOLUTION INTRAMUSCULAR; INTRAVENOUS PRN
Status: DISCONTINUED | OUTPATIENT
Start: 2024-04-11 | End: 2024-04-11 | Stop reason: SDUPTHER

## 2024-04-11 RX ORDER — SODIUM CHLORIDE 9 MG/ML
INJECTION, SOLUTION INTRAVENOUS PRN
Status: DISCONTINUED | OUTPATIENT
Start: 2024-04-11 | End: 2024-04-12 | Stop reason: HOSPADM

## 2024-04-11 RX ORDER — DEXAMETHASONE SODIUM PHOSPHATE 10 MG/ML
INJECTION INTRAMUSCULAR; INTRAVENOUS PRN
Status: DISCONTINUED | OUTPATIENT
Start: 2024-04-11 | End: 2024-04-11 | Stop reason: SDUPTHER

## 2024-04-11 RX ADMIN — DEXAMETHASONE SODIUM PHOSPHATE 8 MG: 10 INJECTION INTRAMUSCULAR; INTRAVENOUS at 10:50

## 2024-04-11 RX ADMIN — ONDANSETRON 4 MG: 2 INJECTION INTRAMUSCULAR; INTRAVENOUS at 12:33

## 2024-04-11 RX ADMIN — GLYCOPYRROLATE 0.4 MG: 0.2 INJECTION INTRAMUSCULAR; INTRAVENOUS at 12:32

## 2024-04-11 RX ADMIN — Medication 2 G: at 10:51

## 2024-04-11 RX ADMIN — GLYCOPYRROLATE 0.2 MG: 0.2 INJECTION INTRAMUSCULAR; INTRAVENOUS at 11:35

## 2024-04-11 RX ADMIN — OXYCODONE 10 MG: 5 TABLET ORAL at 22:11

## 2024-04-11 RX ADMIN — ESCITALOPRAM OXALATE 20 MG: 10 TABLET ORAL at 20:54

## 2024-04-11 RX ADMIN — PROPOFOL 200 MG: 10 INJECTION, EMULSION INTRAVENOUS at 10:44

## 2024-04-11 RX ADMIN — ROCURONIUM BROMIDE 10 MG: 10 INJECTION, SOLUTION INTRAVENOUS at 11:28

## 2024-04-11 RX ADMIN — ALPRAZOLAM 0.5 MG: 0.5 TABLET ORAL at 22:12

## 2024-04-11 RX ADMIN — KETAMINE HYDROCHLORIDE 10 MG: 50 INJECTION, SOLUTION INTRAMUSCULAR; INTRAVENOUS at 11:53

## 2024-04-11 RX ADMIN — SODIUM CHLORIDE, POTASSIUM CHLORIDE, SODIUM LACTATE AND CALCIUM CHLORIDE: 600; 310; 30; 20 INJECTION, SOLUTION INTRAVENOUS at 18:28

## 2024-04-11 RX ADMIN — ACETAMINOPHEN 1000 MG: 500 TABLET, FILM COATED ORAL at 23:41

## 2024-04-11 RX ADMIN — ACETAMINOPHEN 1000 MG: 500 TABLET, FILM COATED ORAL at 08:46

## 2024-04-11 RX ADMIN — HYDROMORPHONE HYDROCHLORIDE 0.5 MG: 1 INJECTION, SOLUTION INTRAMUSCULAR; INTRAVENOUS; SUBCUTANEOUS at 13:02

## 2024-04-11 RX ADMIN — ACETAMINOPHEN 1000 MG: 500 TABLET, FILM COATED ORAL at 17:24

## 2024-04-11 RX ADMIN — SIMETHICONE 80 MG: 80 TABLET, CHEWABLE ORAL at 23:41

## 2024-04-11 RX ADMIN — ROCURONIUM BROMIDE 40 MG: 10 INJECTION, SOLUTION INTRAVENOUS at 10:44

## 2024-04-11 RX ADMIN — SODIUM CHLORIDE, SODIUM LACTATE, POTASSIUM CHLORIDE, AND CALCIUM CHLORIDE: 600; 310; 30; 20 INJECTION, SOLUTION INTRAVENOUS at 10:36

## 2024-04-11 RX ADMIN — SODIUM CHLORIDE, POTASSIUM CHLORIDE, SODIUM LACTATE AND CALCIUM CHLORIDE: 600; 310; 30; 20 INJECTION, SOLUTION INTRAVENOUS at 08:55

## 2024-04-11 RX ADMIN — LIDOCAINE HYDROCHLORIDE 80 MG: 20 INJECTION, SOLUTION EPIDURAL; INFILTRATION; INTRACAUDAL; PERINEURAL at 10:44

## 2024-04-11 RX ADMIN — Medication 3 MG: at 12:32

## 2024-04-11 RX ADMIN — KETAMINE HYDROCHLORIDE 30 MG: 50 INJECTION, SOLUTION INTRAMUSCULAR; INTRAVENOUS at 10:44

## 2024-04-11 RX ADMIN — OXYCODONE 10 MG: 5 TABLET ORAL at 16:30

## 2024-04-11 RX ADMIN — SODIUM CHLORIDE, PRESERVATIVE FREE 10 ML: 5 INJECTION INTRAVENOUS at 20:55

## 2024-04-11 RX ADMIN — KETOROLAC TROMETHAMINE 30 MG: 30 INJECTION, SOLUTION INTRAMUSCULAR at 14:28

## 2024-04-11 RX ADMIN — SIMETHICONE 80 MG: 80 TABLET, CHEWABLE ORAL at 18:11

## 2024-04-11 RX ADMIN — SODIUM CHLORIDE, POTASSIUM CHLORIDE, SODIUM LACTATE AND CALCIUM CHLORIDE: 600; 310; 30; 20 INJECTION, SOLUTION INTRAVENOUS at 14:27

## 2024-04-11 RX ADMIN — HYDROMORPHONE HYDROCHLORIDE 0.5 MG: 2 INJECTION, SOLUTION INTRAMUSCULAR; INTRAVENOUS; SUBCUTANEOUS at 13:22

## 2024-04-11 RX ADMIN — HYDROMORPHONE HYDROCHLORIDE 0.5 MG: 1 INJECTION, SOLUTION INTRAMUSCULAR; INTRAVENOUS; SUBCUTANEOUS at 12:55

## 2024-04-11 RX ADMIN — OXYCODONE 5 MG: 5 TABLET ORAL at 18:23

## 2024-04-11 RX ADMIN — HYDROMORPHONE HYDROCHLORIDE 0.5 MG: 2 INJECTION, SOLUTION INTRAMUSCULAR; INTRAVENOUS; SUBCUTANEOUS at 13:16

## 2024-04-11 RX ADMIN — KETOROLAC TROMETHAMINE 30 MG: 30 INJECTION, SOLUTION INTRAMUSCULAR at 20:55

## 2024-04-11 RX ADMIN — MIDAZOLAM 2 MG: 1 INJECTION INTRAMUSCULAR; INTRAVENOUS at 09:10

## 2024-04-11 RX ADMIN — FENTANYL CITRATE 100 MCG: 50 INJECTION, SOLUTION INTRAMUSCULAR; INTRAVENOUS at 10:44

## 2024-04-11 RX ADMIN — SODIUM CHLORIDE, SODIUM LACTATE, POTASSIUM CHLORIDE, AND CALCIUM CHLORIDE: 600; 310; 30; 20 INJECTION, SOLUTION INTRAVENOUS at 12:38

## 2024-04-11 ASSESSMENT — PAIN SCALES - GENERAL
PAINLEVEL_OUTOF10: 4
PAINLEVEL_OUTOF10: 9
PAINLEVEL_OUTOF10: 7
PAINLEVEL_OUTOF10: 9
PAINLEVEL_OUTOF10: 7
PAINLEVEL_OUTOF10: 8
PAINLEVEL_OUTOF10: 4
PAINLEVEL_OUTOF10: 8
PAINLEVEL_OUTOF10: 5
PAINLEVEL_OUTOF10: 3
PAINLEVEL_OUTOF10: 0

## 2024-04-11 ASSESSMENT — PAIN DESCRIPTION - LOCATION
LOCATION: ABDOMEN;INCISION
LOCATION: ABDOMEN;INCISION
LOCATION: ABDOMEN
LOCATION: ABDOMEN;INCISION
LOCATION: ABDOMEN
LOCATION: ABDOMEN;INCISION;VAGINA
LOCATION: ABDOMEN
LOCATION: ABDOMEN;INCISION

## 2024-04-11 ASSESSMENT — PAIN DESCRIPTION - ORIENTATION
ORIENTATION: LOWER

## 2024-04-11 ASSESSMENT — PAIN DESCRIPTION - PAIN TYPE: TYPE: ACUTE PAIN;SURGICAL PAIN

## 2024-04-11 ASSESSMENT — PAIN DESCRIPTION - DESCRIPTORS
DESCRIPTORS: ACHING

## 2024-04-11 ASSESSMENT — PAIN - FUNCTIONAL ASSESSMENT
PAIN_FUNCTIONAL_ASSESSMENT: 0-10
PAIN_FUNCTIONAL_ASSESSMENT: ACTIVITIES ARE NOT PREVENTED

## 2024-04-11 NOTE — OP NOTE
Operative Note    Patient: Oneyda Bello MRN: [id number]  SSN: [id number]    YOB: 1985  Age: 39 y.o.  Sex: female      Date of Surgery: 4/11/2024      Preoperative Diagnosis: Abnormal uterine bleeding [N93.9]  Dysmenorrhea [N94.6]  Ovarian cyst [N83.209]    Postoperative Diagnosis: Same    Surgeon(s) and Role:     * Sean Navas MD - Primary       Anesthesia: General    Procedure: Total Robotic Assisted Laparoscopic Hysterectomy with Right Salpingo-Oophorectomy     Findings: fibroid uterus, enlarged uterus, and right tube appear normal.  Right ovary was noted to be multicystic and enlarged.    Estimated Blood Loss: less than 50     Drains: none    Pathology/Specimens:     ID Type Source Tests Collected by Time Destination   A : uterus, right fallopian tube and right ovary Tissue Uterus SURGICAL PATHOLOGY Sean Navas MD 4/11/2024 1209        DVT Prophylaxis: SCD Hose    Antibiotic Prophylaxis: Ancef    Procedure in Detail:  After an adequate level of anesthesia was obtained, the patient was prepped and draped in the usual sterile fashion in the dorsal lithotomy position. Time out was performed and agreed upon by all present.  Fierro catheter was placed. Pneumatic compression devices were on a working.  A weighted speculum was placed in vagina and the anterior aspect and the cervix was grasped with a tenaculum. The uterus was sounded to a depth of 4 cm and cervix dilated to allow V care manipulator to be placed.  The infraumbilical incision was injected with 0.5% marcaine and incision made and the Veress needle inserted.  Position was verified with water drop and opening pressure of 2.  The peritoneal cavity was insufflated to pressure of 15 with CO2. The robotic non-bladed trocar was placed. Under visualization a 8mm robotic lateral port was placed on the right and a 8mm robotic and 5mm airseal port was placed on the left.  The robot was then attached to the trocars and the instruments were

## 2024-04-11 NOTE — PERIOP NOTE
TRANSFER - OUT REPORT:    Verbal report given to SEKOU Ledesma on Oneyda Bello  being transferred to Room 344 for routine progression of patient care       Report consisted of patient's Situation, Background, Assessment and   Recommendations(SBAR).     Information from the following report(s) Nurse Handoff Report, Surgery Report, Intake/Output, MAR, Recent Results, and Cardiac Rhythm SR  was reviewed with the receiving nurse.           Lines:   Peripheral IV 04/11/24 Posterior;Right Hand (Active)   Site Assessment Clean, dry & intact 04/11/24 1340   Line Status Infusing 04/11/24 1340   Line Care Connections checked and tightened 04/11/24 1340   Phlebitis Assessment No symptoms 04/11/24 1340   Infiltration Assessment 0 04/11/24 1340   Alcohol Cap Used No 04/11/24 1340   Dressing Status Clean, dry & intact 04/11/24 1340   Dressing Type Transparent 04/11/24 1340        Opportunity for questions and clarification was provided.      Patient transported with:  O2 @ 2lpm

## 2024-04-11 NOTE — ADDENDUM NOTE
Addendum  created 04/11/24 1415 by Campbell Saxena MD    Attestation recorded in Intraprocedure, Intraprocedure Attestations filed

## 2024-04-11 NOTE — PROGRESS NOTES
TRANSFER - IN REPORT:    Verbal report received from Franklin SAPP on Oneyda Bello  being received from PACU for routine post-op      Report consisted of patient's Situation, Background, Assessment and   Recommendations(SBAR).     Information from the following report(s) Nurse Handoff Report, Surgery Report, and MAR was reviewed with the receiving nurse.    Opportunity for questions and clarification was provided.      Assessment completed upon patient's arrival to unit and care assumed.

## 2024-04-11 NOTE — ANESTHESIA PRE PROCEDURE
Department of Anesthesiology  Preprocedure Note       Name:  Oneyda Bello   Age:  39 y.o.  :  1985                                          MRN:  [id number]         Date:  2024      Surgeon: Surgeon(s):  Sean Navas MD    Procedure: Procedure(s):  ERAS/ HYSTERECTOMY ABDOMINAL LAPAROSCOPIC ROBOTIC/ POSS RIGHT SALPINGO-OOPHORECTOMY    Medications prior to admission:   Prior to Admission medications    Medication Sig Start Date End Date Taking? Authorizing Provider   Ubrogepant (UBRELVY) 100 MG TABS Take 100 mg by mouth daily as needed (migraine) Indications: Migraine Headache   Yes Provider, MD Josephine   tretinoin (RETIN-A) 0.025 % cream Apply topically nightly 24   ProviderJosephine MD   ALPRAZolam (XANAX) 0.5 MG tablet 1 tablet as needed. TAKE 1 TABLET BY MOUTH TWICE A DAY/ PRN 19   Automatic Reconciliation, Ar   escitalopram (LEXAPRO) 20 MG tablet Take 1 tablet by mouth at bedtime TAKE 1 TABLET BY MOUTH EVERY DAY 19   Automatic Reconciliation, Ar       Current medications:    Current Facility-Administered Medications   Medication Dose Route Frequency Provider Last Rate Last Admin   • lidocaine 1 % injection 1 mL  1 mL IntraDERmal Once PRN Campbell Saxena MD       • acetaminophen (TYLENOL) tablet 1,000 mg  1,000 mg Oral Once Campbell Saxena MD       • fentaNYL (SUBLIMAZE) injection 100 mcg  100 mcg IntraVENous Once PRN Campbell Saxena MD       • ondansetron (ZOFRAN) injection 4 mg  4 mg IntraVENous Once PRN Campbell Saxena MD       • scopolamine (TRANSDERM-SCOP) transdermal patch 1 patch  1 patch TransDERmal Once Campbell Saxena MD       • lactated ringers IV soln infusion   IntraVENous Continuous Campbell Saxena MD       • sodium chloride flush 0.9 % injection 5-40 mL  5-40 mL IntraVENous 2 times per day Campbell Saxena MD       • sodium chloride flush 0.9 % injection 5-40 mL  5-40 mL IntraVENous PRN Campbell Saxena MD       • 0.9 % sodium chloride infusion

## 2024-04-11 NOTE — PERIOP NOTE
Dr. Saxena at bedside speaking with patient.  She has slightly blurry vision but no blindness.  OK to give up to an additional 1 mg of dilaudid.

## 2024-04-11 NOTE — INTERVAL H&P NOTE
Update History & Physical    The patient's History and Physical of April 4, 2024 was reviewed with the patient and I examined the patient. There was no change. The surgical site was confirmed by the patient and me.     Plan: The risks, benefits, expected outcome, and alternative to the recommended procedure have been discussed with the patient. Patient understands and wants to proceed with the procedure.     Electronically signed by Sean Navas MD on 4/11/2024 at 10:30 AM

## 2024-04-11 NOTE — BRIEF OP NOTE
Brief Postoperative Note      Patient: Oneyda Bello  YOB: 1985  MRN: [id number]    Date of Procedure: 4/11/2024    Pre-Op Diagnosis Codes:     * Abnormal uterine bleeding [N93.9]     * Dysmenorrhea [N94.6]     * Ovarian cyst [N83.209]    Post-Op Diagnosis: Same       Procedure(s):  ERAS/ HYSTERECTOMY ABDOMINAL LAPAROSCOPIC ROBOTIC RIGHT SALPINGO-OOPHORECTOMY    Surgeon(s):  Sean Navas MD    Assistant:  First Assistant: Pooja Porter    Anesthesia: General    Estimated Blood Loss (mL): less than 50     Complications: None    Specimens:   ID Type Source Tests Collected by Time Destination   A : uterus, right fallopian tube and right ovary Tissue Uterus SURGICAL PATHOLOGY Sean Navas MD 4/11/2024 1209        Implants:  * No implants in log *      Drains:   [REMOVED] Urinary Catheter 04/11/24 2 Way;Fierro (Removed)       Findings:  Infection Present At Time Of Surgery (PATOS) (choose all levels that have infection present):  No infection present  Other Findings: Enlarged uterus with fundal posterior fibroid.  Absent left tube and ovary.  Right ovary with multicystic changes.  Right tube normal.  Upper abdomen normal.    Electronically signed by Sean Navas MD on 4/11/2024 at 1:19 PM

## 2024-04-11 NOTE — ANESTHESIA POSTPROCEDURE EVALUATION
Department of Anesthesiology  Postprocedure Note    Patient: Oneyda Bello  MRN: [id number]  YOB: 1985  Date of evaluation: 4/11/2024    Procedure Summary       Date: 04/11/24 Room / Location: [facility] MAIN OR  / [facility] MAIN OR    Anesthesia Start: 1036 Anesthesia Stop: 1253    Procedure: ERAS/ HYSTERECTOMY ABDOMINAL LAPAROSCOPIC ROBOTIC RIGHT SALPINGO-OOPHORECTOMY (Abdomen) Diagnosis:       Abnormal uterine bleeding      Dysmenorrhea      Ovarian cyst      (Abnormal uterine bleeding [N93.9])      (Dysmenorrhea [N94.6])      (Ovarian cyst [N83.209])    Surgeons: Sean Navas MD Responsible Provider: Campbell Saxena MD    Anesthesia Type: general ASA Status: 2            Anesthesia Type: No value filed.    Beau Phase I: Beau Score: 10    Beau Phase II:      Anesthesia Post Evaluation    Patient location during evaluation: PACU  Patient participation: complete - patient participated  Level of consciousness: awake and alert  Pain score: 2  Airway patency: patent  Nausea & Vomiting: no nausea  Cardiovascular status: blood pressure returned to baseline and hemodynamically stable  Respiratory status: acceptable  Hydration status: euvolemic  Comments: Doing well at this time.   Multimodal analgesia pain management approach  Pain management: adequate and satisfactory to patient    No notable events documented.

## 2024-04-12 VITALS
HEART RATE: 82 BPM | WEIGHT: 186.5 LBS | BODY MASS INDEX: 34.32 KG/M2 | RESPIRATION RATE: 17 BRPM | HEIGHT: 62 IN | TEMPERATURE: 98.1 F | SYSTOLIC BLOOD PRESSURE: 121 MMHG | DIASTOLIC BLOOD PRESSURE: 59 MMHG | OXYGEN SATURATION: 97 %

## 2024-04-12 PROCEDURE — 6360000002 HC RX W HCPCS: Performed by: OBSTETRICS & GYNECOLOGY

## 2024-04-12 PROCEDURE — 6370000000 HC RX 637 (ALT 250 FOR IP): Performed by: OBSTETRICS & GYNECOLOGY

## 2024-04-12 PROCEDURE — 2580000003 HC RX 258: Performed by: OBSTETRICS & GYNECOLOGY

## 2024-04-12 RX ORDER — OXYCODONE HYDROCHLORIDE 5 MG/1
5 TABLET ORAL EVERY 6 HOURS PRN
Qty: 20 TABLET | Refills: 0 | Status: SHIPPED | OUTPATIENT
Start: 2024-04-12 | End: 2024-04-17

## 2024-04-12 RX ORDER — IBUPROFEN 800 MG/1
800 TABLET ORAL EVERY 8 HOURS PRN
Qty: 40 TABLET | Refills: 0 | Status: SHIPPED | OUTPATIENT
Start: 2024-04-12

## 2024-04-12 RX ADMIN — SIMETHICONE 80 MG: 80 TABLET, CHEWABLE ORAL at 06:02

## 2024-04-12 RX ADMIN — ACETAMINOPHEN 1000 MG: 500 TABLET, FILM COATED ORAL at 08:46

## 2024-04-12 RX ADMIN — SODIUM CHLORIDE, POTASSIUM CHLORIDE, SODIUM LACTATE AND CALCIUM CHLORIDE: 600; 310; 30; 20 INJECTION, SOLUTION INTRAVENOUS at 02:19

## 2024-04-12 RX ADMIN — KETOROLAC TROMETHAMINE 30 MG: 30 INJECTION, SOLUTION INTRAMUSCULAR at 03:56

## 2024-04-12 RX ADMIN — KETOROLAC TROMETHAMINE 30 MG: 30 INJECTION, SOLUTION INTRAMUSCULAR at 08:45

## 2024-04-12 RX ADMIN — OXYCODONE 10 MG: 5 TABLET ORAL at 06:01

## 2024-04-12 RX ADMIN — OXYCODONE 10 MG: 5 TABLET ORAL at 02:16

## 2024-04-12 ASSESSMENT — PAIN SCALES - GENERAL
PAINLEVEL_OUTOF10: 4
PAINLEVEL_OUTOF10: 7
PAINLEVEL_OUTOF10: 7

## 2024-04-12 ASSESSMENT — PAIN DESCRIPTION - LOCATION
LOCATION: ABDOMEN

## 2024-04-12 ASSESSMENT — PAIN DESCRIPTION - ORIENTATION: ORIENTATION: LOWER

## 2024-04-12 ASSESSMENT — PAIN DESCRIPTION - DESCRIPTORS: DESCRIPTORS: ACHING

## 2024-04-12 NOTE — PROGRESS NOTES
Gynecology Progress Note    Patient doing well post-op day 1 from TLH/RSO robotic without significant complaints.  Pain controlled on current medication.  Voiding without difficulty.     Vitals:  Blood pressure 111/70, pulse 89, temperature 98.4 °F (36.9 °C), temperature source Oral, resp. rate 16, height 1.575 m (5' 2.01\"), weight 84.6 kg (186 lb 8 oz), last menstrual period 2024, SpO2 92 %, not currently breastfeeding.  Temp (24hrs), Av °F (36.7 °C), Min:97.3 °F (36.3 °C), Max:98.6 °F (37 °C)        Exam:  Patient without distress.               Abdomen soft,  normally tender.                 Incisions dry and clean without erythema.               Lower extremities are negative for swelling, cords, or tenderness.    Lab/Data Review:      Assessment and Plan:  Patient appears to be having uncomplicated post OP course.  Continue routine post-op care. Plan discharge home

## 2024-04-12 NOTE — PROGRESS NOTES
04/11/24 2005   Treatment   Treatment Type IS   Oxygen Therapy/Pulse Ox   O2 Therapy Room air   O2 Device None (Room air)   O2 Flow Rate (L/min) 0 L/min   Pulse 82   Respirations 18   SpO2 95 %   Pulse Oximeter Device Mode Intermittent   Pulse Oximeter Device Location Right;Finger   $Pulse Oximeter $Spot check (single)   Incentive Spirometry Tx   Treatment Effort Assisted by RT;Initial   Predicted Volume 2600   Achieved Volume (mL) 2000 mL     Instructed pt in use of IS. Very good effort, technique. Enc pt to use Q1-Q2 hrs w/a x10. Instructed pt on splinting technique. Pt on room air and doing well.  Family at bedside.

## 2024-04-12 NOTE — DISCHARGE SUMMARY
Gynecology Discharge Summary     Name: Oneyda Bello MRN: [id number]  SSN: [id number]    YOB: 1985  Age: 39 y.o.  Sex: female      Allergies: Patient has no known allergies.    Admit Date: 4/11/2024    Discharge Date: 4/12/2024      Admitting Physician: Sean Navas MD     Discharge Physician: Sean Navas MD     * Admission Diagnoses: Abnormal uterine bleeding [N93.9]  Dysmenorrhea [N94.6]  Ovarian cyst [N83.209]  Status post hysterectomy [Z90.710]    * Discharge Diagnoses:      * Procedures: Robotic Assisted Total Laparoscopic Hysterectomy and Right Salpingo-Oophorectomy    Consults: None    * Discharge Condition: Good    * Hospital Course:   Normal post op course    * Discharge Disposition: Home    Discharge Medications:      Medication List        START taking these medications      ibuprofen 800 MG tablet  Commonly known as: ADVIL;MOTRIN  Take 1 tablet by mouth every 8 hours as needed for Pain     oxyCODONE 5 MG immediate release tablet  Commonly known as: ROXICODONE  Take 1 tablet by mouth every 6 hours as needed for Pain for up to 5 days. Max Daily Amount: 20 mg            CONTINUE taking these medications      ALPRAZolam 0.5 MG tablet  Commonly known as: XANAX     escitalopram 20 MG tablet  Commonly known as: LEXAPRO     tretinoin 0.025 % cream  Commonly known as: RETIN-A     Ubrelvy 100 MG Tabs  Generic drug: Ubrogepant               Where to Get Your Medications        These medications were sent to [facility]/pharmacy #[id number] - [place], [place] - [address] [facility] [phone] -  [phone]  [address] [place] [id number]      Hours: 24-hours Phone: [phone]   ibuprofen 800 MG tablet  oxyCODONE 5 MG immediate release tablet          * Follow-up Care/Patient Instructions:  Activity: No sex, douching, or tampons for 6 weeks or as directed by your physician. No heavy lifting for 6 weeks. No driving while taking pain medication.  Diet: Resume pre-hospital diet  Wound Care: keep wound clean  and dry    [unfilled]

## 2024-05-01 ENCOUNTER — OFFICE VISIT (OUTPATIENT)
Dept: OBGYN CLINIC | Age: 39
End: 2024-05-01
Payer: COMMERCIAL

## 2024-05-01 VITALS
HEIGHT: 62 IN | BODY MASS INDEX: 34.16 KG/M2 | DIASTOLIC BLOOD PRESSURE: 66 MMHG | WEIGHT: 185.6 LBS | SYSTOLIC BLOOD PRESSURE: 114 MMHG

## 2024-05-01 DIAGNOSIS — R30.0 DYSURIA: Primary | ICD-10-CM

## 2024-05-01 LAB
BILIRUBIN, URINE, POC: NEGATIVE
BLOOD URINE, POC: NEGATIVE
GLUCOSE URINE, POC: NEGATIVE
KETONES, URINE, POC: NEGATIVE
LEUKOCYTE ESTERASE, URINE, POC: NEGATIVE
NITRITE, URINE, POC: NEGATIVE
PH, URINE, POC: 6 (ref 4.6–8)
PROTEIN,URINE, POC: NEGATIVE
SPECIFIC GRAVITY, URINE, POC: 1.01 (ref 1–1.03)
URINALYSIS CLARITY, POC: CLEAR
URINALYSIS COLOR, POC: YELLOW
UROBILINOGEN, POC: NORMAL

## 2024-05-01 PROCEDURE — 81002 URINALYSIS NONAUTO W/O SCOPE: CPT | Performed by: OBSTETRICS & GYNECOLOGY

## 2024-05-01 PROCEDURE — 99024 POSTOP FOLLOW-UP VISIT: CPT | Performed by: OBSTETRICS & GYNECOLOGY

## 2024-05-01 RX ORDER — ESTRADIOL 0.1 MG/D
1 FILM, EXTENDED RELEASE TRANSDERMAL
Qty: 8 PATCH | Refills: 3 | Status: SHIPPED | OUTPATIENT
Start: 2024-05-02

## 2024-05-01 NOTE — PROGRESS NOTES
Oneyda presents for postop visit from Total Laparoscopic Hysterectomy, Right Salpingo-oophorectomy, and Robotic about 3 weeks ago.  Doing well postoperatively.with a small amount of bleeding that has resolved.  Fever: No Voiding well: Yes but with increased frequency and pain with each time she urinates. She is having pain where her bladder is and describes this as a spasm. Bowel movements OK: Yes.     Bleeding stopped for 1 week then has stopped    Exam: A&OX3, NAD.  Abdomen:  Non tender Incisions clean, dry, intact vaginal cuff appears intact by inspection    Discussed pathology report which was benign    A/P.  Stable Post op condition.  Gradually increase activity. Resumption of sexual activity is not encouraged at this time.  Follow up 4 weeks.  Discussed her symptoms with her and I believe her bladder concerns are related to healing from where the bladder flap was created.  She understands this and is comfortable with the plan.    Portions of this note were created using voice recognition software. Despite my efforts to edit grammatical and transcription errors, bizarre and nonsensical sentences may still exist.

## 2024-06-03 NOTE — PROGRESS NOTES
Oneyda presents for postop visit from Total Laparoscopic Hysterectomy and Right Salpingo-oophorectomy with Robot about 6 weeks ago.  Doing well postoperatively.without any bleeding.  Fever: No Voiding well: Yes.  Bowel movements OK: Yes. Does feel that she is urinating more frequently than she was prior to surgery but is not having any dysuria.  Overall feels much better.  Has not started patches some hot flushes does plan on starting    Exam: A&OX3, NAD.  Abdomen:  Non tender Incisions clean, dry, intact.  Vaginal cuff intact to inspection and palpation    A/P.  Stable Post op condition.  Gradually increase activity. Resumption of sexual activity is not encouraged at this time at least 2 more weeks.  Follow up as needed.  Discussed recommend getting started on estrogen replacement due to early surgical menopause and benefits of estrogen discussed with her.    Portions of this note were created using voice recognition software. Despite my efforts to edit grammatical and transcription errors, bizarre and nonsensical sentences may still exist.

## 2024-06-06 ENCOUNTER — OFFICE VISIT (OUTPATIENT)
Dept: OBGYN CLINIC | Age: 39
End: 2024-06-06

## 2024-06-06 VITALS
SYSTOLIC BLOOD PRESSURE: 118 MMHG | HEIGHT: 62 IN | DIASTOLIC BLOOD PRESSURE: 78 MMHG | WEIGHT: 192.6 LBS | BODY MASS INDEX: 35.44 KG/M2

## 2024-06-06 DIAGNOSIS — Z48.89 ENCOUNTER FOR POST SURGICAL WOUND CHECK: Primary | ICD-10-CM

## 2024-06-06 PROCEDURE — 99024 POSTOP FOLLOW-UP VISIT: CPT | Performed by: OBSTETRICS & GYNECOLOGY

## 2024-06-06 ASSESSMENT — PATIENT HEALTH QUESTIONNAIRE - PHQ9
SUM OF ALL RESPONSES TO PHQ9 QUESTIONS 1 & 2: 0
1. LITTLE INTEREST OR PLEASURE IN DOING THINGS: NOT AT ALL
2. FEELING DOWN, DEPRESSED OR HOPELESS: NOT AT ALL
SUM OF ALL RESPONSES TO PHQ QUESTIONS 1-9: 0

## 2024-06-18 RX ORDER — ESTRADIOL 0.1 MG/D
FILM, EXTENDED RELEASE TRANSDERMAL
Qty: 24 PATCH | Refills: 2 | Status: SHIPPED | OUTPATIENT
Start: 2024-06-18

## 2024-09-18 ENCOUNTER — PATIENT MESSAGE (OUTPATIENT)
Dept: OBGYN CLINIC | Age: 39
End: 2024-09-18

## 2024-09-19 RX ORDER — ESTRADIOL 0.05 MG/D
1 PATCH, EXTENDED RELEASE TRANSDERMAL
Qty: 8 PATCH | Refills: 3 | Status: SHIPPED | OUTPATIENT
Start: 2024-09-19

## 2025-03-16 RX ORDER — ESTRADIOL 0.1 MG/D
FILM, EXTENDED RELEASE TRANSDERMAL
Qty: 24 PATCH | Refills: 2 | Status: CANCELLED | OUTPATIENT
Start: 2025-03-16

## 2025-03-17 RX ORDER — ESTRADIOL 0.1 MG/D
1 FILM, EXTENDED RELEASE TRANSDERMAL
Qty: 8 PATCH | Refills: 0 | Status: SHIPPED | OUTPATIENT
Start: 2025-03-17

## 2025-03-17 RX ORDER — ESTRADIOL 0.1 MG/D
FILM, EXTENDED RELEASE TRANSDERMAL
Qty: 24 PATCH | Refills: 2 | OUTPATIENT
Start: 2025-03-17

## 2025-03-31 ENCOUNTER — OFFICE VISIT (OUTPATIENT)
Dept: OBGYN CLINIC | Age: 40
End: 2025-03-31
Payer: COMMERCIAL

## 2025-03-31 VITALS
DIASTOLIC BLOOD PRESSURE: 64 MMHG | HEIGHT: 62 IN | BODY MASS INDEX: 38.09 KG/M2 | WEIGHT: 207 LBS | SYSTOLIC BLOOD PRESSURE: 102 MMHG

## 2025-03-31 DIAGNOSIS — Z01.419 WELL WOMAN EXAM: Primary | ICD-10-CM

## 2025-03-31 DIAGNOSIS — Z79.890 HORMONE REPLACEMENT THERAPY: ICD-10-CM

## 2025-03-31 DIAGNOSIS — Z13.89 SCREENING FOR GENITOURINARY CONDITION: ICD-10-CM

## 2025-03-31 LAB
BILIRUBIN, URINE, POC: NEGATIVE
BLOOD URINE, POC: NEGATIVE
GLUCOSE URINE, POC: NEGATIVE
KETONES, URINE, POC: NEGATIVE
LEUKOCYTE ESTERASE, URINE, POC: NEGATIVE
NITRITE, URINE, POC: NEGATIVE
PH, URINE, POC: 5 (ref 4.6–8)
PROTEIN,URINE, POC: NEGATIVE
SPECIFIC GRAVITY, URINE, POC: 1.02 (ref 1–1.03)
URINALYSIS CLARITY, POC: CLEAR
URINALYSIS COLOR, POC: YELLOW
UROBILINOGEN, POC: NORMAL MG/DL

## 2025-03-31 PROCEDURE — 99459 PELVIC EXAMINATION: CPT | Performed by: OBSTETRICS & GYNECOLOGY

## 2025-03-31 PROCEDURE — 81002 URINALYSIS NONAUTO W/O SCOPE: CPT | Performed by: OBSTETRICS & GYNECOLOGY

## 2025-03-31 PROCEDURE — 99395 PREV VISIT EST AGE 18-39: CPT | Performed by: OBSTETRICS & GYNECOLOGY

## 2025-03-31 RX ORDER — ESTRADIOL 0.1 MG/D
1 FILM, EXTENDED RELEASE TRANSDERMAL
Qty: 24 PATCH | Refills: 4 | Status: SHIPPED | OUTPATIENT
Start: 2025-03-31

## 2025-03-31 ASSESSMENT — PATIENT HEALTH QUESTIONNAIRE - PHQ9
SUM OF ALL RESPONSES TO PHQ QUESTIONS 1-9: 0
SUM OF ALL RESPONSES TO PHQ QUESTIONS 1-9: 0
1. LITTLE INTEREST OR PLEASURE IN DOING THINGS: NOT AT ALL
2. FEELING DOWN, DEPRESSED OR HOPELESS: NOT AT ALL
SUM OF ALL RESPONSES TO PHQ QUESTIONS 1-9: 0
SUM OF ALL RESPONSES TO PHQ QUESTIONS 1-9: 0

## 2025-03-31 NOTE — PROGRESS NOTES
HPI:  Ms. Bello is a 40 y.o. female who is here today for a well woman exam. She complains of needs refill on Vivelle patches.  She is doing very well and does not wish any changes    No Known Allergies    Current Outpatient Medications   Medication Sig Dispense Refill    estradiol (VIVELLE) 0.1 MG/24HR Place 1 patch onto the skin Twice a Week 24 patch 4    Ubrogepant (UBRELVY) 100 MG TABS Take 100 mg by mouth daily as needed (migraine) Indications: Migraine Headache      tretinoin (RETIN-A) 0.025 % cream Apply topically nightly      ALPRAZolam (XANAX) 0.5 MG tablet 1 tablet as needed. TAKE 1 TABLET BY MOUTH TWICE A DAY/ PRN      escitalopram (LEXAPRO) 20 MG tablet Take 1 tablet by mouth at bedtime TAKE 1 TABLET BY MOUTH EVERY DAY       No current facility-administered medications for this visit.       Past Medical History:   Diagnosis Date    Abnormal Papanicolaou smear of cervix     Anxiety     BMI 33.0-33.9,adult     Childhood asthma     Complication of anesthesia     Blindness when waking from Anesthesia in 2002 & 2003    Depression     Endometriosis     GERD (gastroesophageal reflux disease)     Migraine     prn med    Ovarian cyst     UTI (urinary tract infection)      Past Surgical History:   Procedure Laterality Date    HYSTERECTOMY (CERVIX STATUS UNKNOWN) N/A 4/11/2024    ERAS/ HYSTERECTOMY ABDOMINAL LAPAROSCOPIC ROBOTIC RIGHT SALPINGO-OOPHORECTOMY performed by Sean Navas MD at [facility] [place]    LAPAROSCOPY  2002    SALPINGO-OOPHORECTOMY Left 12/2003    SKIN CANCER EXCISION      WISDOM TOOTH EXTRACTION       Social History     Socioeconomic History    Marital status:      Spouse name: Not on file    Number of children: Not on file    Years of education: Not on file    Highest education level: Not on file   Occupational History    Not on file   Tobacco Use    Smoking status: Never     Passive exposure: Past    Smokeless tobacco: Never   Vaping Use    Vaping status: Never Used   Substance and Sexual

## (undated) DEVICE — TRI-LUMEN FILTERED TUBE SET WITH ACTIVATED CHARCOAL FILTER: Brand: AIRSEAL

## (undated) DEVICE — APPLICATOR MEDICATED 26 CC SOLUTION HI LT ORNG CHLORAPREP

## (undated) DEVICE — AIRSEAL 8 MM ACCESS PORT AND LOW PROFILE OBTURATOR WITH BLADELESS OPTICAL TIP, 120 MM LENGTH: Brand: AIRSEAL

## (undated) DEVICE — VCARE MEDIUM, UTERINE MANIPULATOR, VAGINAL-CERVICAL-AHLUWALIA'S-RETRACTOR-ELEVATOR: Brand: VCARE

## (undated) DEVICE — PAD PT POS 36 IN SURGYPAD DISP

## (undated) DEVICE — VESSEL SEALER EXTEND: Brand: ENDOWRIST

## (undated) DEVICE — LIQUIBAND RAPID ADHESIVE 36/CS 0.8ML: Brand: MEDLINE

## (undated) DEVICE — BLADELESS OBTURATOR: Brand: WECK VISTA

## (undated) DEVICE — COLUMN DRAPE

## (undated) DEVICE — ROBOTIC DRAPE WITH LEGGINGS: Brand: CONVERTORS

## (undated) DEVICE — SUTURE MONOCRYL SZ 4-0 L27IN ABSRB UD L19MM PS-2 1/2 CIR PRIM Y426H

## (undated) DEVICE — SOLUTION IRRIG 1000ML STRL H2O USP PLAS POUR BTL

## (undated) DEVICE — TIP COVER ACCESSORY

## (undated) DEVICE — ARM DRAPE

## (undated) DEVICE — SOLUTION IV 1000ML LAC RINGERS PH 6.5 INJ USP VIAFLX PLAS

## (undated) DEVICE — ROBOTIC HYSTERECTOMY: Brand: MEDLINE INDUSTRIES, INC.

## (undated) DEVICE — SUTURE V-LOC 180 SZ 0 L12IN ABSRB GRN L37MM GS-21 1/2 CIR VLOCL0316

## (undated) DEVICE — CANISTER, RIGID, 2000CC: Brand: MEDLINE INDUSTRIES, INC.

## (undated) DEVICE — SUTURE VICRYL SZ 0 L36IN ABSRB UD L36MM CT-1 1/2 CIR J946H

## (undated) DEVICE — SEAL

## (undated) DEVICE — GLOVE SURG SZ 75 CRM LTX FREE POLYISOPRENE POLYMER BEAD ANTI

## (undated) DEVICE — 1LYRTR 16FR10ML 100%SILI SNAP: Brand: MEDLINE INDUSTRIES, INC.

## (undated) DEVICE — GLOVE SURG SZ 8 L12IN FNGR THK79MIL GRN LTX FREE